# Patient Record
Sex: FEMALE | Race: WHITE | NOT HISPANIC OR LATINO | Employment: FULL TIME | URBAN - METROPOLITAN AREA
[De-identification: names, ages, dates, MRNs, and addresses within clinical notes are randomized per-mention and may not be internally consistent; named-entity substitution may affect disease eponyms.]

---

## 2021-03-19 ENCOUNTER — INITIAL PRENATAL (OUTPATIENT)
Dept: OBSTETRICS AND GYNECOLOGY | Facility: CLINIC | Age: 21
End: 2021-03-19

## 2021-03-19 VITALS
BODY MASS INDEX: 18.16 KG/M2 | HEIGHT: 61 IN | WEIGHT: 96.2 LBS | SYSTOLIC BLOOD PRESSURE: 100 MMHG | DIASTOLIC BLOOD PRESSURE: 60 MMHG

## 2021-03-19 DIAGNOSIS — Z34.91 FIRST TRIMESTER PREGNANCY: ICD-10-CM

## 2021-03-19 PROCEDURE — 76817 TRANSVAGINAL US OBSTETRIC: CPT | Performed by: OBSTETRICS & GYNECOLOGY

## 2021-03-19 PROCEDURE — 99202 OFFICE O/P NEW SF 15 MIN: CPT | Performed by: OBSTETRICS & GYNECOLOGY

## 2021-03-19 RX ORDER — PRENATAL VIT/IRON FUM/FOLIC AC 27MG-0.8MG
TABLET ORAL DAILY
COMMUNITY

## 2021-03-19 NOTE — PROGRESS NOTES
Initial ob visit     CC- Here for care of pregnancy        Julieth Khan is a 20 y.o. female, , who presents for her first obstetrical visit.  Her last LMP was Patient's last menstrual period was 01/15/2021..    OB History    Para Term  AB Living   1             SAB TAB Ectopic Molar Multiple Live Births                    # Outcome Date GA Lbr Ankit/2nd Weight Sex Delivery Anes PTL Lv   1 Current                Initial positive test date : 2021 , UPT          Prior obstetric issues, potential pregnancy concerns: none  Family history of genetic issues (includes FOB): FOB has child with someone else that has autism.  Prior infections concerning in pregnancy (Rash, fever in last 2 weeks): none  Varicella Hx - no  Prior testing for Cystic Fibrosis Carrier or Sickle Cell Trait- no  Prepregnancy BMI - Body mass index is 18.18 kg/m².  History of STD: no    Additional Pertinent History   Last Pap :   Last Completed Pap Smear    Patient has no health maintenance due at this time       History of abnormal Pap smear: no  Family history of uterine, colon, breast, or ovarian cancer: no  Performs monthly Self-Breast Exam: yes  Exercises Regularly: no  Feelings of Anxiety or Depression: yes - anxiety & depression  Tobacco Usage?: No   Alcohol/Drug Use?: YES Drug: occasional/rare use - marijuana  Over the age of 35 at delivery: no  Desires Genetic Screening: Cell Free DNA    PMH  History reviewed. No pertinent past medical history.    Current Outpatient Medications:   •  Prenatal Vit-Fe Fumarate-FA (prenatal vitamin 27-0.8) 27-0.8 MG tablet tablet, Take  by mouth Daily., Disp: , Rfl:     The additional following portions of the patient's history were reviewed and updated as appropriate: allergies, current medications, past family history, past medical history, past social history, past surgical history and problem list.    Review of Systems   Review of Systems  Current obstetric complaints : none   All systems  "reviewed and otherwise normal.    I have reviewed and agree with the HPI, ROS, and historical information as entered above. Logan Mckay MD    /60   Ht 154.9 cm (61\")   Wt 43.6 kg (96 lb 3.2 oz)   LMP 01/15/2021   BMI 18.18 kg/m²     Physical Exam  General Appearance:    Alert, cooperative, in no acute distress,    Head:    Normocephalic, without obvious abnormality, atraumatic   Neck:   No adenopathy, supple, trachea midline, no thyromegaly   Back:     No kyphosis present, no scoliosis present,                       Lungs:     Clear to auscultation,respirations regular, even and     unlabored    Heart:    Regular rhythm and normal rate, normal S1 and S2, no            murmur, no gallop, no rub, no click   Breast Exam:    No masses, No nipple discharge   Abdomen:     Normal bowel sounds, no masses, no organomegaly, soft        non-tender, non-distended, no guarding, no rebound                 tenderness   Genitalia:    Vulva - BUS-WNL, NEFG    Vagina - No discharge, No bleeding    Cervix - No Lesions, closed     Uterus - Consistent with 9w0d weeks    Adnexa - No mass, NT    Pelvimetry - clinically adequate, gynecoid pelvis     Extremities:   Moves all extremities well, no edema, no cyanosis, no         redness   Pulses:   Pulses palpable and equal bilaterally   Skin:   No bleeding, bruising or rash   Lymph nodes:   No palpable adenopathy   Neurologic:   Sensation intact, A&O times 3      Physical Exam has been reviewed and confirmed by Logan Mckay MD    Assessment/Plan   Assessment     Problem List Items Addressed This Visit     None      Visit Diagnoses     First trimester pregnancy        Relevant Orders    OB Panel With HIV    HIV-1 / O / 2 Ag / Antibody 4th Generation    Urine Culture - Urine, Urine, Clean Catch    Urine Drug Screen - Urine, Clean Catch          1. Pregnancy at 9w0d  2.     Plan     1. Reviewed routine prenatal care with the office and educational materials given  2. Lab(s) " Ordered  3. Discussed options for genetic testing including first trimester nuchal translucency screen, genetic disease carrier testing, quadruple screen, and Nebo.  4. Follow up: 1 month(s)  5.       Logan Mckay MD  03/19/2021

## 2021-03-25 LAB
ABO GROUP BLD: NORMAL
AMPHETAMINES UR QL SCN: NEGATIVE NG/ML
BACTERIA UR CULT: NORMAL
BACTERIA UR CULT: NORMAL
BARBITURATES UR QL SCN: NEGATIVE NG/ML
BASOPHILS # BLD AUTO: 0 X10E3/UL (ref 0–0.2)
BASOPHILS NFR BLD AUTO: 0 %
BENZODIAZ UR QL SCN: NEGATIVE NG/ML
BLD GP AB SCN SERPL QL: NEGATIVE
BZE UR QL SCN: NEGATIVE NG/ML
CANNABINOIDS UR QL SCN: NEGATIVE NG/ML
CREAT UR-MCNC: 99 MG/DL (ref 20–300)
EOSINOPHIL # BLD AUTO: 0 X10E3/UL (ref 0–0.4)
EOSINOPHIL NFR BLD AUTO: 1 %
ERYTHROCYTE [DISTWIDTH] IN BLOOD BY AUTOMATED COUNT: 11.8 % (ref 11.7–15.4)
HBV SURFACE AG SERPL QL IA: NEGATIVE
HCT VFR BLD AUTO: 38.3 % (ref 34–46.6)
HCV AB S/CO SERPL IA: <0.1 S/CO RATIO (ref 0–0.9)
HGB BLD-MCNC: 13.1 G/DL (ref 11.1–15.9)
HIV 1+2 AB+HIV1 P24 AG SERPL QL IA: NON REACTIVE
IMM GRANULOCYTES # BLD AUTO: 0 X10E3/UL (ref 0–0.1)
IMM GRANULOCYTES NFR BLD AUTO: 0 %
LABORATORY COMMENT REPORT: NORMAL
LYMPHOCYTES # BLD AUTO: 1.8 X10E3/UL (ref 0.7–3.1)
LYMPHOCYTES NFR BLD AUTO: 25 %
MCH RBC QN AUTO: 30 PG (ref 26.6–33)
MCHC RBC AUTO-ENTMCNC: 34.2 G/DL (ref 31.5–35.7)
MCV RBC AUTO: 88 FL (ref 79–97)
METHADONE UR QL SCN: NEGATIVE NG/ML
MONOCYTES # BLD AUTO: 0.5 X10E3/UL (ref 0.1–0.9)
MONOCYTES NFR BLD AUTO: 7 %
NEUTROPHILS # BLD AUTO: 4.8 X10E3/UL (ref 1.4–7)
NEUTROPHILS NFR BLD AUTO: 67 %
OPIATES UR QL SCN: NEGATIVE NG/ML
OXYCODONE+OXYMORPHONE UR QL SCN: NEGATIVE NG/ML
PCP UR QL: NEGATIVE NG/ML
PH UR: 6.6 [PH] (ref 4.5–8.9)
PLATELET # BLD AUTO: 257 X10E3/UL (ref 150–450)
PROPOXYPH UR QL SCN: NEGATIVE NG/ML
RBC # BLD AUTO: 4.36 X10E6/UL (ref 3.77–5.28)
RH BLD: POSITIVE
RPR SER QL: NON REACTIVE
RUBV IGG SERPL IA-ACNC: 1.77 INDEX
WBC # BLD AUTO: 7.2 X10E3/UL (ref 3.4–10.8)

## 2021-03-29 ENCOUNTER — TELEPHONE (OUTPATIENT)
Dept: OBSTETRICS AND GYNECOLOGY | Facility: CLINIC | Age: 21
End: 2021-03-29

## 2021-03-29 NOTE — TELEPHONE ENCOUNTER
Patient calling for results of labs from 03/19. Requests that we call her at this number: [phone]

## 2021-04-16 ENCOUNTER — ROUTINE PRENATAL (OUTPATIENT)
Dept: OBSTETRICS AND GYNECOLOGY | Facility: CLINIC | Age: 21
End: 2021-04-16

## 2021-04-16 VITALS — SYSTOLIC BLOOD PRESSURE: 110 MMHG | BODY MASS INDEX: 18.37 KG/M2 | DIASTOLIC BLOOD PRESSURE: 50 MMHG | WEIGHT: 97.2 LBS

## 2021-04-16 DIAGNOSIS — Z20.2 POSSIBLE EXPOSURE TO STD: ICD-10-CM

## 2021-04-16 DIAGNOSIS — Z34.90 PREGNANCY, UNSPECIFIED GESTATIONAL AGE: Primary | ICD-10-CM

## 2021-04-16 DIAGNOSIS — Z3A.13 13 WEEKS GESTATION OF PREGNANCY: ICD-10-CM

## 2021-04-16 LAB
EXPIRATION DATE: 0
GLUCOSE UR STRIP-MCNC: NEGATIVE MG/DL
Lab: 0
PROT UR STRIP-MCNC: NEGATIVE MG/DL

## 2021-04-16 PROCEDURE — 99212 OFFICE O/P EST SF 10 MIN: CPT | Performed by: OBSTETRICS & GYNECOLOGY

## 2021-04-16 NOTE — PROGRESS NOTES
OB FOLLOW UP  CC- Here for care of pregnancy        Julieth Khan is a 20 y.o.  13w0d patient being seen today for her obstetrical follow up visit. Patient reports headache. Tylenol helps. Denies vision changes.     Her prenatal care is complicated by (and status) :    There is no problem list on file for this patient.      Flu Status: no longer flu season  Ultrasound Today: No.    ROS -   Patient Reports : Headaches  Patient Denies: Loss of Fluid, Vaginal Spotting, Vision Changes, Nausea , Vomiting , Contractions and Epigastric pain  Fetal Movement : absent  All other systems reviewed and are negative.       The additional following portions of the patient's history were reviewed and updated as appropriate: allergies, current medications, past family history, past medical history, past social history, past surgical history and problem list.    I have reviewed and agree with the HPI, ROS, and historical information as entered above. Logan Mckay MD    /50   Wt 44.1 kg (97 lb 3.2 oz)   LMP 01/15/2021   BMI 18.37 kg/m²       EXAM:     FHT: 166 BPM   Uterine Size: size equals dates  Pelvic Exam: No    Urine glucose/protein: See prenatal flowsheet       Assessment and Plan    Problem List Items Addressed This Visit     None      Visit Diagnoses     Pregnancy, unspecified gestational age    -  Primary    Relevant Orders    POC Glucose, Urine, Qualitative, Dipstick (Completed)    POC Protein, Urine, Qualitative, Dipstick (Completed)    13 weeks gestation of pregnancy        Relevant Orders    Chlamydia trachomatis, Neisseria gonorrhoeae, PCR w/ confirmation - Urine, Urine, Clean Catch    [id number] PLUS Core+SCA+ESS - Blood,    Possible exposure to STD        Relevant Orders    Chlamydia trachomatis, Neisseria gonorrhoeae, PCR w/ confirmation - Urine, Urine, Clean Catch          1. Pregnancy at 13w0d  2. Fetal status reassuring.   3. Activity and Exercise discussed.  No follow-ups on file.    Logan Mckay  MD  04/16/2021

## 2021-04-18 LAB
C TRACH RRNA SPEC QL NAA+PROBE: NEGATIVE
N GONORRHOEA RRNA SPEC QL NAA+PROBE: NEGATIVE

## 2021-05-14 ENCOUNTER — ROUTINE PRENATAL (OUTPATIENT)
Dept: OBSTETRICS AND GYNECOLOGY | Facility: CLINIC | Age: 21
End: 2021-05-14

## 2021-05-14 VITALS — BODY MASS INDEX: 19.01 KG/M2 | WEIGHT: 100.6 LBS | SYSTOLIC BLOOD PRESSURE: 105 MMHG | DIASTOLIC BLOOD PRESSURE: 65 MMHG

## 2021-05-14 DIAGNOSIS — Z34.90 PREGNANCY, UNSPECIFIED GESTATIONAL AGE: Primary | ICD-10-CM

## 2021-05-14 LAB
GLUCOSE UR STRIP-MCNC: NEGATIVE MG/DL
PROT UR STRIP-MCNC: NEGATIVE MG/DL

## 2021-05-14 PROCEDURE — 99213 OFFICE O/P EST LOW 20 MIN: CPT | Performed by: OBSTETRICS & GYNECOLOGY

## 2021-05-14 NOTE — PROGRESS NOTES
OB FOLLOW UP  CC- Here for care of pregnancy        Julieth Khan is a 20 y.o.  17w0d patient being seen today for her obstetrical follow up visit. Patient reports headache and heartburn. Patient states she got dizzy while at work and had to sit down.    Her prenatal care is complicated by (and status) :    There is no problem list on file for this patient.      Flu Status: Declines  Ultrasound Today: No.    ROS -   Patient Reports : Headaches  Patient Denies: Loss of Fluid, Vaginal Spotting, Vision Changes, Headaches, Nausea , Vomiting , Contractions and Epigastric pain  Fetal Movement : normal  All other systems reviewed and are negative.       The additional following portions of the patient's history were reviewed and updated as appropriate: allergies, current medications, past family history, past medical history, past social history, past surgical history and problem list.    I have reviewed and agree with the HPI, ROS, and historical information as entered above. Logan Mckay MD    /65   Wt 45.6 kg (100 lb 9.6 oz)   LMP 01/15/2021   BMI 19.01 kg/m²       EXAM:     FHT: 160 BPM   Uterine Size: size equals dates  Pelvic Exam: No    Urine glucose/protein: See prenatal flowsheet       Assessment and Plan    Problem List Items Addressed This Visit     None      Visit Diagnoses     Pregnancy, unspecified gestational age    -  Primary    Relevant Orders    POC Urinalysis Dipstick (Completed)          1. Pregnancy at 17w0d  2. Fetal status reassuring.   3. Activity and Exercise discussed.  No follow-ups on file.    Logan Mckay MD  2021

## 2021-06-04 ENCOUNTER — TELEPHONE (OUTPATIENT)
Dept: OBSTETRICS AND GYNECOLOGY | Facility: CLINIC | Age: 21
End: 2021-06-04

## 2021-06-04 DIAGNOSIS — Z3A.20 20 WEEKS GESTATION OF PREGNANCY: Primary | ICD-10-CM

## 2021-06-04 NOTE — TELEPHONE ENCOUNTER
Patient overslept and out of town; unable to make it to her appointment on time today. Requested to reschedule. Rescheduled patient to 4pm anatomy scan on 6/7/21, then appointment with Vera.

## 2021-06-07 ENCOUNTER — ROUTINE PRENATAL (OUTPATIENT)
Dept: OBSTETRICS AND GYNECOLOGY | Facility: CLINIC | Age: 21
End: 2021-06-07

## 2021-06-07 VITALS — DIASTOLIC BLOOD PRESSURE: 60 MMHG | BODY MASS INDEX: 19.84 KG/M2 | SYSTOLIC BLOOD PRESSURE: 100 MMHG | WEIGHT: 105 LBS

## 2021-06-07 DIAGNOSIS — Z3A.20 20 WEEKS GESTATION OF PREGNANCY: Primary | ICD-10-CM

## 2021-06-07 LAB
GLUCOSE UR STRIP-MCNC: NEGATIVE MG/DL
PROT UR STRIP-MCNC: NEGATIVE MG/DL

## 2021-06-07 PROCEDURE — 99213 OFFICE O/P EST LOW 20 MIN: CPT | Performed by: NURSE PRACTITIONER

## 2021-06-07 NOTE — PROGRESS NOTES
OB FOLLOW UP  CC- Here for care of pregnancy        Julieth Khan is a 20 y.o.  20w3d patient being seen today for her obstetrical follow up visit. Patient reports no complaints.     Her prenatal care is complicated by (and status) :    There is no problem list on file for this patient.      Ultrasound Today: Yes.      ROS -   Patient Reports : No Problems  Patient Denies: Loss of Fluid, Vaginal Spotting, Vision Changes, Headaches and Contractions  Fetal Movement : normal  All other systems reviewed and are negative.       The additional following portions of the patient's history were reviewed and updated as appropriate: allergies, current medications, past family history, past medical history, past social history, past surgical history and problem list.    I have reviewed and agree with the HPI, ROS, and historical information as entered above. ADRIANA Bell    /60   Wt 47.6 kg (105 lb)   LMP 01/15/2021   BMI 19.84 kg/m²       EXAM:     FHT: 157 BPM   Pelvic Exam: No    Urine glucose/protein: See prenatal flowsheet       Assessment and Plan    Problem List Items Addressed This Visit     None      Visit Diagnoses     20 weeks gestation of pregnancy    -  Primary    Relevant Orders    POC Urinalysis Dipstick (Completed)          1. Pregnancy at 20w3d  2. Fetal status reassuring. Anatomy scan wnl.   3. Activity and Exercise discussed.  Return in about 4 weeks (around 2021).    ADRIANA Bell  2021

## 2021-07-06 ENCOUNTER — ROUTINE PRENATAL (OUTPATIENT)
Dept: OBSTETRICS AND GYNECOLOGY | Facility: CLINIC | Age: 21
End: 2021-07-06

## 2021-07-06 VITALS — BODY MASS INDEX: 21.73 KG/M2 | WEIGHT: 115 LBS | DIASTOLIC BLOOD PRESSURE: 62 MMHG | SYSTOLIC BLOOD PRESSURE: 100 MMHG

## 2021-07-06 DIAGNOSIS — Z3A.24 24 WEEKS GESTATION OF PREGNANCY: Primary | ICD-10-CM

## 2021-07-06 LAB
GLUCOSE UR STRIP-MCNC: NEGATIVE MG/DL
PROT UR STRIP-MCNC: NEGATIVE MG/DL

## 2021-07-06 PROCEDURE — 99212 OFFICE O/P EST SF 10 MIN: CPT | Performed by: OBSTETRICS & GYNECOLOGY

## 2021-07-06 NOTE — PROGRESS NOTES
OB FOLLOW UP  CC- Here for care of pregnancy        Julieth Khan is a 20 y.o.  24w4d patient being seen today for her obstetrical follow up visit. Patient reports leg cramps.     Her prenatal care is complicated by (and status) :    There is no problem list on file for this patient.      Flu Status: Declines  Ultrasound Today: No.    ROS -   Patient Reports : leg cramps  Patient Denies: Loss of Fluid and Vaginal Spotting  Fetal Movement : normal  All other systems reviewed and are negative.       The additional following portions of the patient's history were reviewed and updated as appropriate: allergies and current medications.    I have reviewed and agree with the HPI, ROS, and historical information as entered above. Logan Mckay MD    /62   Wt 52.2 kg (115 lb)   LMP 01/15/2021   BMI 21.73 kg/m²       EXAM:     FHT: 152 BPM   Uterine Size: size equals dates  Pelvic Exam: No    Urine glucose/protein: See prenatal flowsheet       Assessment and Plan    Problem List Items Addressed This Visit     None      Visit Diagnoses     24 weeks gestation of pregnancy    -  Primary    Relevant Orders    POC Urinalysis Dipstick (Completed)          1. Pregnancy at 24w4d  2. Fetal status reassuring.   3. Activity and Exercise discussed.  No follow-ups on file.    Logan Mckay MD  2021

## 2021-08-05 ENCOUNTER — ROUTINE PRENATAL (OUTPATIENT)
Dept: OBSTETRICS AND GYNECOLOGY | Facility: CLINIC | Age: 21
End: 2021-08-05

## 2021-08-05 VITALS — BODY MASS INDEX: 23.01 KG/M2 | DIASTOLIC BLOOD PRESSURE: 70 MMHG | SYSTOLIC BLOOD PRESSURE: 116 MMHG | WEIGHT: 121.8 LBS

## 2021-08-05 DIAGNOSIS — Z3A.28 28 WEEKS GESTATION OF PREGNANCY: Primary | ICD-10-CM

## 2021-08-05 LAB
EXPIRATION DATE: 0
GLUCOSE UR STRIP-MCNC: NEGATIVE MG/DL
Lab: 0
PROT UR STRIP-MCNC: NEGATIVE MG/DL

## 2021-08-05 PROCEDURE — 90715 TDAP VACCINE 7 YRS/> IM: CPT | Performed by: OBSTETRICS & GYNECOLOGY

## 2021-08-05 PROCEDURE — 90471 IMMUNIZATION ADMIN: CPT | Performed by: OBSTETRICS & GYNECOLOGY

## 2021-08-05 PROCEDURE — 99212 OFFICE O/P EST SF 10 MIN: CPT | Performed by: OBSTETRICS & GYNECOLOGY

## 2021-08-05 NOTE — PROGRESS NOTES
OB FOLLOW UP  CC- Here for care of pregnancy          Julieth Khan is a 21 y.o.  28w6d patient being seen today for her obstetrical follow up. Patient reports heartburn.     Patient undergoing Glucola testing today. She is due for her testing at 12:09pm.     MBT: A+  Rhogam Given: N/A  TDAP: given today  Breast Pump: prescription given  Ultrasound Today: No.    Her prenatal care is complicated by (and status) :    There is no problem list on file for this patient.        ROS -   Patient Reports : heartburn  Patient Denies: Loss of Fluid, Vaginal Spotting, Vision Changes, Headaches, Nausea , Vomiting , Contractions and Epigastric pain  Fetal Movement : normal    The additional following portions of the patient's history were reviewed and updated as appropriate: allergies, current medications, past family history, past medical history, past social history and past surgical history.    I have reviewed and agree with the HPI, ROS, and historical information as entered above. Logan Mckay MD    /70   Wt 55.2 kg (121 lb 12.8 oz)   LMP 01/15/2021   BMI 23.01 kg/m²         EXAM: not done     FHT:   BPM   Uterine Size: size equals dates  Pelvic Exam: No       Assessment and Plan    Problem List Items Addressed This Visit     None      Visit Diagnoses     28 weeks gestation of pregnancy    -  Primary    Relevant Orders    Gestational Screen 1 Hr ([facility])    CBC (No Diff)    Antibody Screen    POC Glucose, Urine, Qualitative, Dipstick (Completed)    POC Protein, Urine, Qualitative, Dipstick (Completed)    Tdap Vaccine Greater Than or Equal To 6yo IM (Completed)          1. Pregnancy at 28w6d  2. Fetal status reassuring.   3. Activity and Exercise discussed.  No follow-ups on file.    Logan Mckay MD  2021

## 2021-08-06 LAB
BLD GP AB SCN SERPL QL: NEGATIVE
ERYTHROCYTE [DISTWIDTH] IN BLOOD BY AUTOMATED COUNT: 12.1 % (ref 12.3–15.4)
GLUCOSE 1H P 50 G GLC PO SERPL-MCNC: 103 MG/DL (ref 65–139)
HCT VFR BLD AUTO: 32.4 % (ref 34–46.6)
HGB BLD-MCNC: 11.2 G/DL (ref 12–15.9)
MCH RBC QN AUTO: 31 PG (ref 26.6–33)
MCHC RBC AUTO-ENTMCNC: 34.6 G/DL (ref 31.5–35.7)
MCV RBC AUTO: 89.8 FL (ref 79–97)
PLATELET # BLD AUTO: 254 10*3/MM3 (ref 140–450)
RBC # BLD AUTO: 3.61 10*6/MM3 (ref 3.77–5.28)
WBC # BLD AUTO: 9.55 10*3/MM3 (ref 3.4–10.8)

## 2021-08-12 ENCOUNTER — HOSPITAL ENCOUNTER (OUTPATIENT)
Facility: HOSPITAL | Age: 21
End: 2021-08-12
Attending: OBSTETRICS & GYNECOLOGY | Admitting: OBSTETRICS & GYNECOLOGY

## 2021-08-12 ENCOUNTER — HOSPITAL ENCOUNTER (OUTPATIENT)
Facility: HOSPITAL | Age: 21
Discharge: HOME | End: 2021-08-12
Attending: OBSTETRICS & GYNECOLOGY | Admitting: OBSTETRICS & GYNECOLOGY

## 2021-08-12 VITALS
RESPIRATION RATE: 18 BRPM | TEMPERATURE: 98 F | DIASTOLIC BLOOD PRESSURE: 71 MMHG | SYSTOLIC BLOOD PRESSURE: 121 MMHG | HEART RATE: 93 BPM

## 2021-08-12 PROBLEM — O26.893 ABDOMINAL PAIN DURING PREGNANCY, THIRD TRIMESTER: Status: ACTIVE | Noted: 2021-08-12

## 2021-08-12 PROBLEM — R10.9 ABDOMINAL PAIN DURING PREGNANCY, THIRD TRIMESTER: Status: ACTIVE | Noted: 2021-08-12

## 2021-08-12 LAB
BILIRUB UR QL STRIP: NEGATIVE
CLARITY UR: CLEAR
COLOR UR: YELLOW
GLUCOSE UR STRIP-MCNC: NEGATIVE MG/DL
HGB UR QL STRIP.AUTO: NEGATIVE
KETONES UR QL STRIP: NEGATIVE
LEUKOCYTE ESTERASE UR QL STRIP.AUTO: NEGATIVE
NITRITE UR QL STRIP: NEGATIVE
PH UR STRIP.AUTO: 5.5 [PH] (ref 5–8)
PROT UR QL STRIP: NEGATIVE
SP GR UR STRIP: 1.02 (ref 1–1.03)
UROBILINOGEN UR QL STRIP: NORMAL

## 2021-08-12 PROCEDURE — 87086 URINE CULTURE/COLONY COUNT: CPT | Performed by: OBSTETRICS & GYNECOLOGY

## 2021-08-12 PROCEDURE — 99213 OFFICE O/P EST LOW 20 MIN: CPT | Performed by: OBSTETRICS & GYNECOLOGY

## 2021-08-12 PROCEDURE — G0463 HOSPITAL OUTPT CLINIC VISIT: HCPCS

## 2021-08-12 PROCEDURE — 59025 FETAL NON-STRESS TEST: CPT

## 2021-08-12 PROCEDURE — 81003 URINALYSIS AUTO W/O SCOPE: CPT | Performed by: OBSTETRICS & GYNECOLOGY

## 2021-08-12 PROCEDURE — 59025 FETAL NON-STRESS TEST: CPT | Performed by: OBSTETRICS & GYNECOLOGY

## 2021-08-12 NOTE — H&P
Julieth Carrier  2000  [id number]  [id number]    CC: abdominal pain  HPI:  Patient is 21 y.o. female   currently at 29w6d presents with c/o right sided abd pain, onset ~4 days ago, constant, stabbing, radiates to back, rates 9/10 (mckeon/baker 4/10), pain is worse lying down and better sitting up.  Pt denies vag bleeding, uterine contractions, leaking, N, V, or D.  Pt further denies fever or dysuria.  Good FM.  No recent intercourse.  BPNC to date     PMH:  Current meds: PNV  Illnesses: none  Surgeries: eye surg  Allergies: NKDA    Past OB History:       OB History    Para Term  AB Living   1 0 0 0 0 0   SAB TAB Ectopic Molar Multiple Live Births   0 0 0 0 0 0      # Outcome Date GA Lbr Ankit/2nd Weight Sex Delivery Anes PTL Lv   1 Current                     SH: tob neg , EtOH neg, drugs neg  FH: heart dz neg , diabetes neg , cancer neg    General ROS: abd pain.   All other systems reviewed and are negative.      Physical Examination: General appearance - alert, well appearing, and in no distress  Vital signs - /71   Pulse 93   Temp 98 °F (36.7 °C) (Oral)   Resp 18   LMP 01/15/2021   HEENT: normocephalic, atraumatic,oropharynx clear, appearance of ears and nose normal  Neck - supple, no significant adenopathy, no thyromegaly  Lymphatics - no palpable lymphadenopathy in the neck or groin, no hepatosplenomegaly  Chest - clear to auscultation, no wheezes, rales or rhonchi, respiratory effort non-labored  Heart - normal rate, regular rhythm, no murmurs, rubs, clicks or gallops, no JVD, no lower extremity edema  Abdomen - soft, nontender, nondistended, no masses, no hepatosplenomegaly  no rebound tenderness noted, bowel sounds normal  Vaginal Exam: cl/70%/-1, no blood in vault ,external genitalia normal  Extremities - no pedal edema noted, no calf tend  Skin -warm and dry, normal coloration and turgor, no rashes, no suspicious skin lesions noted    Fetal monitoring: indication abd pain , onset  0344 , offset 0416 , baseline 135-140 , mod BTB variability , multiple accels (15 X 15), no decels, rare contractions, interpretation reactive NST    Radiology     Assessment 1) IUP 29 6/7 weeks   2)abd pain- exam unrevealing, no evid  labor, ccUA neg, positional (poss    musculo-skeletal?), doubt appendicitis       Plan 1)observe    2)home    3)ES tyl only for now   4)return if pain worsens of accompaning    symptoms    Jameel Jha MD  2021  04:22 EDT

## 2021-08-13 LAB — BACTERIA SPEC AEROBE CULT: NORMAL
